# Patient Record
Sex: FEMALE | Race: WHITE | Employment: FULL TIME | URBAN - METROPOLITAN AREA
[De-identification: names, ages, dates, MRNs, and addresses within clinical notes are randomized per-mention and may not be internally consistent; named-entity substitution may affect disease eponyms.]

---

## 2020-02-19 ENCOUNTER — HOSPITAL ENCOUNTER (OUTPATIENT)
Dept: MAMMOGRAPHY | Age: 55
Discharge: HOME | End: 2020-02-19
Attending: PHYSICIAN ASSISTANT
Payer: COMMERCIAL

## 2020-02-19 DIAGNOSIS — N64.4 BREAST PAIN, LEFT: ICD-10-CM

## 2020-02-19 PROCEDURE — 76642 ULTRASOUND BREAST LIMITED: CPT

## 2020-02-19 PROCEDURE — 77066 DX MAMMO INCL CAD BI: CPT

## 2021-12-29 ENCOUNTER — APPOINTMENT (RX ONLY)
Dept: URBAN - METROPOLITAN AREA CLINIC 349 | Facility: CLINIC | Age: 56
Setting detail: DERMATOLOGY
End: 2021-12-29

## 2021-12-29 DIAGNOSIS — D22 MELANOCYTIC NEVI: ICD-10-CM

## 2021-12-29 DIAGNOSIS — D485 NEOPLASM OF UNCERTAIN BEHAVIOR OF SKIN: ICD-10-CM

## 2021-12-29 DIAGNOSIS — L82.1 OTHER SEBORRHEIC KERATOSIS: ICD-10-CM

## 2021-12-29 PROBLEM — D48.5 NEOPLASM OF UNCERTAIN BEHAVIOR OF SKIN: Status: ACTIVE | Noted: 2021-12-29

## 2021-12-29 PROBLEM — D23.39 OTHER BENIGN NEOPLASM OF SKIN OF OTHER PARTS OF FACE: Status: ACTIVE | Noted: 2021-12-29

## 2021-12-29 PROBLEM — D22.5 MELANOCYTIC NEVI OF TRUNK: Status: ACTIVE | Noted: 2021-12-29

## 2021-12-29 PROCEDURE — ? BIOPSY BY SHAVE METHOD

## 2021-12-29 PROCEDURE — 11102 TANGNTL BX SKIN SINGLE LES: CPT

## 2021-12-29 PROCEDURE — 99203 OFFICE O/P NEW LOW 30 MIN: CPT | Mod: 25

## 2021-12-29 PROCEDURE — ? COUNSELING

## 2021-12-29 ASSESSMENT — LOCATION DETAILED DESCRIPTION DERM
LOCATION DETAILED: LEFT PROXIMAL PRETIBIAL REGION
LOCATION DETAILED: RIGHT MEDIAL EYEBROW
LOCATION DETAILED: SUPERIOR THORACIC SPINE

## 2021-12-29 ASSESSMENT — LOCATION SIMPLE DESCRIPTION DERM
LOCATION SIMPLE: UPPER BACK
LOCATION SIMPLE: LEFT PRETIBIAL REGION
LOCATION SIMPLE: RIGHT EYEBROW

## 2021-12-29 ASSESSMENT — LOCATION ZONE DERM
LOCATION ZONE: FACE
LOCATION ZONE: TRUNK
LOCATION ZONE: LEG

## 2021-12-29 NOTE — PROCEDURE: BIOPSY BY SHAVE METHOD
X Size Of Lesion In Cm: 0
Anesthesia Volume In Cc (Will Not Render If 0): 0.5
Hide Biopsy Depth?: No
Information: Selecting Yes will display possible errors in your note based on the variables you have selected. This validation is only offered as a suggestion for you. PLEASE NOTE THAT THE VALIDATION TEXT WILL BE REMOVED WHEN YOU FINALIZE YOUR NOTE. IF YOU WANT TO FAX A PRELIMINARY NOTE YOU WILL NEED TO TOGGLE THIS TO 'NO' IF YOU DO NOT WANT IT IN YOUR FAXED NOTE.
Depth Of Biopsy: dermis
Hemostasis: Aluminum Chloride
Size Of Lesion In Cm: 0.6
Biopsy Type: H and E
Billing Type: Third-Party Bill
Validate Note Data (See Information Below): Yes
Consent: Written consent was obtained and risks were reviewed including but not limited to scarring, infection, bleeding, scabbing, incomplete removal, nerve damage and allergy to anesthesia.
Dressing: bandage
Anesthesia Type: 1% lidocaine with 1:500,000 epinephrine and a 1:10 solution of 8.4% sodium bicarbonate
Detail Level: Detailed
Wound Care: Vaseline
Notification Instructions: Patient will be notified of biopsy results. However, patient instructed to call the office if not contacted within 2 weeks.
Type Of Destruction Used: Electrodesiccation
Biopsy Method: Personna blade
Electrodesiccation Text: The wound bed was treated with electrodesiccation after the biopsy was performed.
Cryotherapy Text: The wound bed was treated with cryotherapy after the biopsy was performed.
Post-Care Instructions: I reviewed with the patient in detail post-care instructions. Patient is to keep the biopsy site dry overnight, and then apply bacitracin twice daily until healed. Patient may apply hydrogen peroxide soaks to remove any crusting.\\nAft the procedure, the patient was observed for 5-10 minutes and was oriented to person, place, and time.  Denied feeling dizzy, queasy, and stated that they did not feel that they were going to faint.

## 2022-09-09 ENCOUNTER — OFFICE VISIT (OUTPATIENT)
Dept: ORTHOPEDIC SURGERY | Age: 57
End: 2022-09-09
Payer: COMMERCIAL

## 2022-09-09 DIAGNOSIS — M25.561 RIGHT KNEE PAIN, UNSPECIFIED CHRONICITY: Primary | ICD-10-CM

## 2022-09-09 PROCEDURE — 99203 OFFICE O/P NEW LOW 30 MIN: CPT | Performed by: ORTHOPAEDIC SURGERY

## 2022-09-09 NOTE — PROGRESS NOTES
Patient ID:  Nehemiah Aldridge  [id number]  47 y.o.  1965    Today: September 9, 2022          Chief Complaint: Right Knee pain    HPI:       Nehemiah Aldridge is a 55 y.o. female  that presents today for evaluation and treatment of right knee pain. Patient reports onset of pain some time ago ago. The patient complains of knee pain with activities, reports stiffness of the knee with prolonged inactivity, and swelling/pain at the end of the day and after increased physical activity. Pain is described as a general ache with occasional sharp pain, primarily along the joint lines. They rate the pain as a fluctuating between 3-7. Generally, symptoms improve with sitting/rest.  The pain affects the patients activities of daily living and quality of life. Patient reports progressive pain and instability in the knee. Prior procedures on the knee include none. Patient has attempted prior conservative treatment including Activity Modifications. They have had success with prior treatments. Prior Activity Modifications has provided moderate relief. Past Medical History:  No past medical history on file. Past Surgical History:  No past surgical history on file. Medications:     Prior to Admission medications    Not on File       Family History:   No family history on file. Social History:      Social History     Tobacco Use    Smoking status: Not on file    Smokeless tobacco: Not on file   Substance Use Topics    Alcohol use: Not on file         Allergies:    No Known Allergies     Vitals: There were no vitals taken for this visit. ROS:  Patient Health Form has been filled out, reviewed, signed and included in the chart. ROS findings related to musculoskeletal problems were discussed with the patient. Patient advised to discuss non-orthopedic complaints with primary care physician. Objective:     Vitals: There were no vitals taken for this visit. General: Awake and alert. AAOx3.    Psych: Mood appropriate  HEENT: Normocephalic, atraumatic  Neck: Supple  CV/Pulm: Breathing even and unlabored  Abdomen: Nondistended  Circulation: Normal without obvious arterial or venous deficiency. Pulses palpable bilateral lower extremities. Lymphatic: No obvious lymphedema or lymphadenopathy noted. Skin: No obvious lacerations or rashes noted. Musculoskeletal: No obvious deformity or pain with active movement of the upper extremities. Neuro: No obvious deficiency or weakness noted of the upper or lower extremities    Knee Exam:  There is pain with ROM of both knees. Range of motion on the right is 0-130. Range of motion on the left is 0-130. Trace effusion noted in the knees. Patellofemoral crepitus is present. Tenderness along the joint line both at rest and during motion. There is no significant ligamentous laxity. Distally the patient shows no neurologic deficit. Sensation is grossly intact. The patient is able to grossly plantar- and dorsi-flex the involved foot/ankle. Imaging (obtained today or previously):    XR Knee 3/4 View  Views: AP knee, skiers PA, lateral knee, sunrise view right knee  Clinical indication: Right Knee Pain   Findings: Evidence of mild osteoarthritic changes. The joint line appears to be fairly well maintained although there appears to be some narrowing of the space. No significant osteophyte formation noted. No advanced subchondral cyst formation noted or sclerosis appreciated. Impression:Knee Osteoarthritis    Loreto Ramos MD      Assessment:   1. Bilateral Knee Arthritis    Plan:  Treatment option have been discussed with the patient. The patient understands the nature of knee arthritis and that this is generally a progressive condition. We discussed oral anti-inflammatory medications, activity modifications, physical therapy, corticosteroid injections, weight loss (if appropriate), and surgery.  We discussed that given the degenerative nature of the joint that in most cases surgery is the definitive treatment for this condition. We did discuss some of the details of surgery along with some of the risks, benefits and alternatives. At this point the patient is not a candidate for surgery secondary to the patients desire to exhaust conservative treatment options and delay surgery. . At this point the patient has failed the aforementioned treatment modalities and would like to proceed with Self-Administered Conservative Care    Treatment:    Self-Administered Conservative Care-patient would like to continue prior treatment regimens including PRN OTC medications and self directed activity modifications, stretching and strengthening.  If the patient feels at some point that this is no longer effective patient can return to office and we can discuss further treatment options        Signed By: Yamila Gonzalez MD  September 9, 2022

## 2025-02-06 ENCOUNTER — HOSPITAL ENCOUNTER (OUTPATIENT)
Dept: ULTRASOUND IMAGING | Age: 60
Discharge: HOME | End: 2025-02-08
Payer: COMMERCIAL

## 2025-02-06 ENCOUNTER — TRANSCRIBE ORDERS (OUTPATIENT)
Dept: SCHEDULING | Age: 60
End: 2025-02-06

## 2025-02-06 DIAGNOSIS — M79.89 HAND SWELLING: Primary | ICD-10-CM

## 2025-02-06 DIAGNOSIS — M79.89 HAND SWELLING: ICD-10-CM

## 2025-02-06 PROCEDURE — 93971 EXTREMITY STUDY: CPT

## 2025-02-21 ENCOUNTER — TRANSCRIBE ORDERS (OUTPATIENT)
Dept: SCHEDULING | Age: 60
End: 2025-02-21

## 2025-02-21 DIAGNOSIS — R06.09 DYSPNEA ON EXERTION: Primary | ICD-10-CM

## 2025-03-01 ENCOUNTER — HOSPITAL ENCOUNTER (OUTPATIENT)
Dept: MAMMOGRAPHY | Age: 60
End: 2025-03-01
Payer: COMMERCIAL

## 2025-03-01 VITALS — WEIGHT: 250 LBS | BODY MASS INDEX: 46.01 KG/M2 | HEIGHT: 62 IN

## 2025-03-01 DIAGNOSIS — Z12.31 VISIT FOR SCREENING MAMMOGRAM: ICD-10-CM

## 2025-03-01 PROCEDURE — 77063 BREAST TOMOSYNTHESIS BI: CPT

## 2025-03-03 ENCOUNTER — INITIAL CONSULT (OUTPATIENT)
Age: 60
End: 2025-03-03
Payer: COMMERCIAL

## 2025-03-03 VITALS
HEIGHT: 62 IN | HEART RATE: 83 BPM | BODY MASS INDEX: 46.38 KG/M2 | SYSTOLIC BLOOD PRESSURE: 176 MMHG | WEIGHT: 252 LBS | DIASTOLIC BLOOD PRESSURE: 80 MMHG

## 2025-03-03 DIAGNOSIS — R00.2 PALPITATIONS: Primary | ICD-10-CM

## 2025-03-03 PROCEDURE — 93000 ELECTROCARDIOGRAM COMPLETE: CPT | Performed by: INTERNAL MEDICINE

## 2025-03-03 PROCEDURE — 99203 OFFICE O/P NEW LOW 30 MIN: CPT | Performed by: INTERNAL MEDICINE

## 2025-03-03 RX ORDER — LOSARTAN POTASSIUM 50 MG/1
50 TABLET ORAL DAILY
COMMUNITY

## 2025-03-03 RX ORDER — METOPROLOL SUCCINATE 25 MG/1
25 TABLET, EXTENDED RELEASE ORAL DAILY
Qty: 30 TABLET | Refills: 3 | Status: SHIPPED | OUTPATIENT
Start: 2025-03-03

## 2025-03-03 RX ORDER — HYDROCHLOROTHIAZIDE 12.5 MG/1
12.5 CAPSULE ORAL DAILY
COMMUNITY

## 2025-03-03 RX ORDER — METFORMIN HYDROCHLORIDE 750 MG/1
750 TABLET, EXTENDED RELEASE ORAL
COMMUNITY
Start: 2025-02-13

## 2025-03-03 RX ORDER — LEVOTHYROXINE SODIUM 125 UG/1
62.5 TABLET ORAL DAILY
COMMUNITY

## 2025-03-03 NOTE — PROGRESS NOTES
[facility] CARDIOLOGY  [address] [place] [address], [address]  [place] [address]  PHONE: [phone]        25        NAME:  Marta Crawley  : 1965  MRN: [id number]     CHIEF COMPLAINT:    Palpitations      SUBJECTIVE:     58 yo female   Recent LUE edema  US negative  Went on a crise  Was anxious  Fast pulse off and on  Home from cruise 2 week  1 episode racing .    Phx:  +htn  +dm  +stephanie    Phx:  NC    Shx:  . Non smoker. Banker.    Ros:  NC       Medications were all reviewed with the patient today and updated as necessary.   Current Outpatient Medications   Medication Sig    losartan (COZAAR) 50 MG tablet Take 1 tablet by mouth daily    levothyroxine (SYNTHROID) 125 MCG tablet Take 0.5 tablets by mouth Daily    metFORMIN (GLUCOPHAGE-XR) 750 MG extended release tablet Take 1 tablet by mouth daily (with breakfast)    hydroCHLOROthiazide 12.5 MG capsule Take 1 capsule by mouth daily    metoprolol succinate (TOPROL XL) 25 MG extended release tablet Take 1 tablet by mouth daily     No current facility-administered medications for this visit.        No Known Allergies        PHYSICAL EXAM:     Wt Readings from Last 3 Encounters:   25 114.3 kg (252 lb)   25 113.4 kg (250 lb)     BP Readings from Last 3 Encounters:   25 (!) 176/80       BP (!) 176/80 Comment: 170/82  Pulse 83   Ht 1.575 m (5' 2\")   Wt 114.3 kg (252 lb)   BMI 46.09 kg/m²     Physical Exam  Vitals reviewed.   HENT:      Head: Normocephalic and atraumatic.   Eyes:      Extraocular Movements: Extraocular movements intact.      Pupils: Pupils are equal, round, and reactive to light.   Cardiovascular:      Rate and Rhythm: Normal rate.      Heart sounds: Normal heart sounds.   Pulmonary:      Effort: Pulmonary effort is normal.      Breath sounds: Normal breath sounds.   Abdominal:      General: Abdomen is flat.      Palpations: Abdomen is soft. There is no mass.   Musculoskeletal:         General: Normal range of

## 2025-03-07 ENCOUNTER — TRANSCRIBE ORDERS (OUTPATIENT)
Dept: SCHEDULING | Age: 60
End: 2025-03-07

## 2025-03-07 DIAGNOSIS — Z12.31 VISIT FOR SCREENING MAMMOGRAM: Primary | ICD-10-CM

## 2025-03-21 ENCOUNTER — HOSPITAL ENCOUNTER (OUTPATIENT)
Dept: MAMMOGRAPHY | Age: 60
Discharge: HOME | End: 2025-03-24
Payer: COMMERCIAL

## 2025-03-21 DIAGNOSIS — R92.8 ABNORMAL SCREENING MAMMOGRAM: ICD-10-CM

## 2025-03-21 PROCEDURE — G0279 TOMOSYNTHESIS, MAMMO: HCPCS

## 2025-03-25 ENCOUNTER — TELEPHONE (OUTPATIENT)
Dept: ORTHOPEDIC SURGERY | Age: 60
End: 2025-03-25

## 2025-03-25 NOTE — TELEPHONE ENCOUNTER
Swelling of left hand/ no sx, noxray US in [facility]       Okay to schedule this pt.?

## 2025-03-31 ENCOUNTER — OFFICE VISIT (OUTPATIENT)
Age: 60
End: 2025-03-31
Payer: COMMERCIAL

## 2025-03-31 DIAGNOSIS — M79.89 SWELLING OF LEFT HAND: Primary | ICD-10-CM

## 2025-03-31 PROCEDURE — 99204 OFFICE O/P NEW MOD 45 MIN: CPT | Performed by: ORTHOPAEDIC SURGERY

## 2025-03-31 RX ORDER — MELOXICAM 15 MG/1
15 TABLET ORAL DAILY
Qty: 45 TABLET | Refills: 0 | Status: SHIPPED | OUTPATIENT
Start: 2025-03-31 | End: 2025-05-15

## 2025-03-31 RX ORDER — METHYLPREDNISOLONE 4 MG/1
TABLET ORAL
Qty: 1 KIT | Refills: 0 | Status: SHIPPED | OUTPATIENT
Start: 2025-03-31

## 2025-03-31 NOTE — PROGRESS NOTES
Orthopedic Hand Surgery Note    Marta Crawley  1965  female    History of Present Illness  The patient is a new patient to my clinic. She is here for evaluation of left hand swelling. Symptoms have been going on for 1 month. No injury that she can remember. The swelling does not really cause any pain, but it does cause stiffness, especially in the mornings that last about 30 minutes. She does not have any of these issues on the other hand. She has had multiple episodes of bilateral leg swelling for many years and they have never determined the cost, but she has been worked up for all causes of leg swelling including heart disease liver, etc.    She reports that the swelling in her left hand is not associated with any specific injury. Although the swelling is not painful, it does result in stiffness, particularly in the mornings, lasting approximately 30 minutes. These symptoms are not present in her right hand.    Physical Exam  There is swelling on the dorsal aspect of the left hand, but no tenderness to palpation of the hand, wrist or fingers. The patient can make a full composite fist, although with significant tightness. Normal sensation is present in all fingers.    Imaging/NCS    left Hand XR: AP, Lateral, Oblique and Thumb CMC joint     Clinical Indication:  1. Swelling of left hand           Report: AP, lateral, oblique and thumb CMC joint hand XRs demonstrates well-maintained joint spaces without evidence of fracture or dislocations, no evidence of synovitis or arthritis    Impression: as above     Courtney Franklin MD     Visit Diagnoses         Codes      Swelling of left hand    -  Primary M79.89          Assessment & Plan  1. Left hand swelling.  She reports left hand swelling for the past month without any remembered injury. The swelling causes stiffness, especially in the mornings, lasting about 30 minutes. On physical examination, there is swelling on the dorsal aspect of the left hand

## 2025-04-03 NOTE — PROGRESS NOTES
[facility] CARDIOLOGY  [address] [place] [address], [address]  [place] [address]  PHONE: [phone]        25        NAME:  Marta Crawley  : 1965  MRN: [id number]     CHIEF COMPLAINT:    Palpitations      SUBJECTIVE:     No further palpitation.      PHYSICAL EXAM:     Wt Readings from Last 3 Encounters:   25 117 kg (258 lb)   25 114.3 kg (251 lb 15.8 oz)   25 113.4 kg (250 lb)     BP Readings from Last 3 Encounters:   25 (!) 150/82   25 (!) 160/84   25 (!) 176/80       BP (!) 150/82 Comment: 150/86  Pulse 72   Ht 1.575 m (5' 2\")   Wt 117 kg (258 lb)   BMI 47.19 kg/m²     Physical Exam  Vitals reviewed.   HENT:      Head: Normocephalic and atraumatic.   Eyes:      Extraocular Movements: Extraocular movements intact.      Pupils: Pupils are equal, round, and reactive to light.   Cardiovascular:      Rate and Rhythm: Normal rate.      Heart sounds: Normal heart sounds.   Pulmonary:      Effort: Pulmonary effort is normal.      Breath sounds: Normal breath sounds.   Abdominal:      General: Abdomen is flat.      Palpations: Abdomen is soft. There is no mass.   Musculoskeletal:         General: Normal range of motion.      Cervical back: Normal range of motion.   Skin:     General: Skin is warm and dry.   Neurological:      General: No focal deficit present.      Mental Status: She is alert and oriented to person, place, and time.   Psychiatric:         Mood and Affect: Mood normal.           RECENT LABS AND RECORDS REVIEW    Echo: Negative  Holter: unremarkable    No results found for any visits on 25.     ASSESSMENT and PLAN    Marta was seen today for palpitations.    Diagnoses and all orders for this visit:    Palpitation    Benign  Diet, exercise weight loss, re trial tirzepatide are encouraged.     Return in about 6 months (around 10/4/2025).       GAY العلي MD  2025  9:04 AM

## 2025-04-04 ENCOUNTER — OFFICE VISIT (OUTPATIENT)
Age: 60
End: 2025-04-04
Payer: COMMERCIAL

## 2025-04-04 VITALS
DIASTOLIC BLOOD PRESSURE: 82 MMHG | WEIGHT: 258 LBS | SYSTOLIC BLOOD PRESSURE: 150 MMHG | BODY MASS INDEX: 47.48 KG/M2 | HEIGHT: 62 IN | HEART RATE: 72 BPM

## 2025-04-04 DIAGNOSIS — R00.2 PALPITATION: Primary | ICD-10-CM

## 2025-04-04 PROCEDURE — 99214 OFFICE O/P EST MOD 30 MIN: CPT | Performed by: INTERNAL MEDICINE

## 2025-04-04 RX ORDER — METOPROLOL SUCCINATE 25 MG/1
25 TABLET, EXTENDED RELEASE ORAL DAILY
Qty: 90 TABLET | Refills: 3 | Status: SHIPPED | OUTPATIENT
Start: 2025-04-04

## 2025-04-24 ENCOUNTER — TRANSCRIBE ORDERS (OUTPATIENT)
Dept: SCHEDULING | Age: 60
End: 2025-04-24

## 2025-04-24 DIAGNOSIS — M79.89 SWELLING OF LIMB: Primary | ICD-10-CM

## 2025-07-03 ENCOUNTER — OFFICE VISIT (OUTPATIENT)
Dept: VASCULAR SURGERY | Age: 60
End: 2025-07-03
Payer: COMMERCIAL

## 2025-07-03 VITALS
HEART RATE: 83 BPM | WEIGHT: 247.5 LBS | BODY MASS INDEX: 45.27 KG/M2 | SYSTOLIC BLOOD PRESSURE: 126 MMHG | DIASTOLIC BLOOD PRESSURE: 81 MMHG | OXYGEN SATURATION: 98 %

## 2025-07-03 DIAGNOSIS — R22.32 LOCALIZED SWELLING ON LEFT HAND: Primary | ICD-10-CM

## 2025-07-03 PROCEDURE — 99203 OFFICE O/P NEW LOW 30 MIN: CPT | Performed by: STUDENT IN AN ORGANIZED HEALTH CARE EDUCATION/TRAINING PROGRAM

## 2025-07-03 NOTE — PROGRESS NOTES
VASCULAR SURGERY   [address] [place] [address] [address] [address]  [phone] FAX: [phone]    Marta Crawley  : 1965    Chief Complaint   Patient presents with    New Patient    Results     New patient; Venous Duplex u/s  Left Arm Swelling       History of Present Illness  The patient is a 59-year-old female who presents with left hand and forearm swelling.    She has been experiencing swelling in her left hand and forearm for the past few months, which was particularly noticeable in the early morning and previously hindered her ability to make a fist or hold right angles. However, she reports that the swelling has since improved and is minimal at present. The swelling is confined to her hand and forearm, not affecting the entire upper extremity. She does not elevate her hand at night. She reports that the swelling has become manageable and is not significantly debilitating. She has no history of trauma or signs indicative of thoracic outlet syndrome. There is no history of neoplasms, including breast cancer or lymphoma. She has had multiple duplex ultrasounds, all of which have been negative for DVT. She was previously seen by Dr. Pinon, a hand surgeon, who recommended a wrist compression sleeve and Mobic. However, she reports that the medication did not provide significant relief. She uses the sleeve intermittently.    MEDICATIONS  Mobic    Assessment & Plan  1. Left hand and forearm swelling.  The differential diagnosis includes lymphedema. The swelling is isolated to the hand and forearm, with no history of trauma, neoplasms, or signs of thoracic outlet syndrome. Multiple duplex ultrasounds have been negative for DVT. Elevation and compression with continued use of the compression sleeve are recommended. F/u as needed.    No orders of the defined types were placed in this encounter.      25    VAS DUP UPPER EXTREMITY VENOUS LEFT 2025 10:22 AM (Final)    Narrative  Left upper